# Patient Record
Sex: FEMALE | Race: WHITE | NOT HISPANIC OR LATINO | Employment: UNEMPLOYED | URBAN - METROPOLITAN AREA
[De-identification: names, ages, dates, MRNs, and addresses within clinical notes are randomized per-mention and may not be internally consistent; named-entity substitution may affect disease eponyms.]

---

## 2019-11-04 ENCOUNTER — OFFICE VISIT (OUTPATIENT)
Dept: URGENT CARE | Facility: CLINIC | Age: 59
End: 2019-11-04
Payer: MEDICARE

## 2019-11-04 VITALS
BODY MASS INDEX: 31.28 KG/M2 | HEIGHT: 62 IN | OXYGEN SATURATION: 96 % | SYSTOLIC BLOOD PRESSURE: 128 MMHG | WEIGHT: 170 LBS | TEMPERATURE: 97.6 F | HEART RATE: 88 BPM | RESPIRATION RATE: 16 BRPM | DIASTOLIC BLOOD PRESSURE: 84 MMHG

## 2019-11-04 DIAGNOSIS — L03.114 CELLULITIS OF LEFT HAND: ICD-10-CM

## 2019-11-04 PROCEDURE — 99204 OFFICE O/P NEW MOD 45 MIN: CPT | Performed by: NURSE PRACTITIONER

## 2019-11-04 RX ORDER — CLONAZEPAM 0.5 MG/1
TABLET ORAL
Refills: 1 | COMMUNITY
Start: 2019-10-09

## 2019-11-04 RX ORDER — CLONIDINE HYDROCHLORIDE 0.1 MG/1
TABLET ORAL
Refills: 1 | COMMUNITY
Start: 2019-10-23

## 2019-11-04 RX ORDER — ATORVASTATIN CALCIUM 20 MG/1
TABLET, FILM COATED ORAL
Refills: 3 | COMMUNITY
Start: 2019-09-24

## 2019-11-04 RX ORDER — DULOXETIN HYDROCHLORIDE 60 MG/1
60 CAPSULE, DELAYED RELEASE ORAL
Refills: 2 | COMMUNITY
Start: 2019-10-24

## 2019-11-04 RX ORDER — CEPHALEXIN 500 MG/1
500 CAPSULE ORAL 4 TIMES DAILY
Qty: 28 CAP | Refills: 0 | Status: SHIPPED | OUTPATIENT
Start: 2019-11-04 | End: 2019-11-11

## 2019-11-04 RX ORDER — AMITRIPTYLINE HYDROCHLORIDE 10 MG/1
TABLET, FILM COATED ORAL
Refills: 2 | COMMUNITY
Start: 2019-10-26

## 2019-11-04 RX ORDER — GABAPENTIN 300 MG/1
300 CAPSULE ORAL
Refills: 1 | COMMUNITY
Start: 2019-10-29

## 2019-11-04 RX ORDER — MELOXICAM 15 MG/1
TABLET ORAL
Refills: 0 | COMMUNITY
Start: 2019-09-17

## 2019-11-04 RX ORDER — ALBUTEROL SULFATE 90 UG/1
AEROSOL, METERED RESPIRATORY (INHALATION)
Refills: 1 | COMMUNITY
Start: 2019-10-29

## 2019-11-04 ASSESSMENT — ENCOUNTER SYMPTOMS
FEVER: 0
CONSTITUTIONAL NEGATIVE: 1
SHORTNESS OF BREATH: 0
CHILLS: 0
RESPIRATORY NEGATIVE: 1
MUSCULOSKELETAL NEGATIVE: 1
NEUROLOGICAL NEGATIVE: 1
CARDIOVASCULAR NEGATIVE: 1

## 2019-11-04 NOTE — PROGRESS NOTES
Subjective:     Madhuri Martinez is a 59 y.o. female who presents for Hand Pain (x3 days, (L) hand bruising and swelling, pain while performing ROMs)       Other   This is a new problem. The problem has been gradually worsening. Pertinent negatives include no chills or fever.     Patient reports that 2 days ago she started to experience redness of the top of her left hand.  She reports that it is starting to spread outward.  Denies acute injury.  Has full range of motion of her left hand.  She thinks she may have been bitten by an insect but is unsure.  Has not identified an insect.  Prior therapy: Icing and elevation.  Patient reports she has an ablation procedure tomorrow.    PMH:  has a past medical history of Anxiety, Chronic back pain, Depression, Irritable bowel syndrome, Mitral valve prolapse, and Tobacco abuse (3/5/2010).    MEDS:   Current Outpatient Medications:   •  VENTOLIN  (90 Base) MCG/ACT Aero Soln inhalation aerosol, INHALE 2 PUFFS ORALLY FOUR TIMES DAILY AS NEEDED FOR WHEEZING OR DIFFICULTY BREATHING, Disp: , Rfl: 1  •  amitriptyline (ELAVIL) 10 MG Tab, TAKE 1 TABLET BY MOUTH EVERYDAY AT BEDTIME, Disp: , Rfl: 2  •  atorvastatin (LIPITOR) 20 MG Tab, START 1/2 TABLET ORALLY EVERY EVENING FOR 1 WEEK INCREASE TO 1 TABLET ORALLY EVERY EVENING, Disp: , Rfl: 3  •  clonazePAM (KLONOPIN) 0.5 MG Tab, TAKE 1 TABLET BY MOUTH FOUR TIMES A DAY AS NEEDED 8/13 F41.9, Disp: , Rfl: 1  •  cloNIDine (CATAPRES) 0.1 MG Tab, TAKE 1 TABLET BY MOUTH UP TO TWICE DAILY FOR BLOOD PRESSURE, Disp: , Rfl: 1  •  DULoxetine (CYMBALTA) 60 MG Cap DR Particles delayed-release capsule, Take 60 mg by mouth., Disp: , Rfl: 2  •  gabapentin (NEURONTIN) 300 MG Cap, Take 300 mg by mouth., Disp: , Rfl: 1  •  cephALEXin (KEFLEX) 500 MG Cap, Take 1 Cap by mouth 4 times a day for 7 days., Disp: 28 Cap, Rfl: 0  •  alprazolam (XANAX) 1 MG Tab, Take 0.5 Tabs by mouth 3 times a day as needed for Sleep or Anxiety., Disp: 30 Tab, Rfl: 0  •   "Multiple Vitamins-Minerals (WOMENS MULTIVITAMIN PLUS) Tab, Take 1 Tab by mouth every day., Disp: , Rfl:   •  Calcium Carbonate (CALCIUM 600 PO), Take 1 Tab by mouth every day., Disp: , Rfl:   •  simvastatin (ZOCOR) 40 MG Tab, Take 40 mg by mouth every evening., Disp: , Rfl:   •  levothyroxine (SYNTHROID) 100 MCG Tab, Take 1 Tab by mouth every morning before breakfast., Disp: 30 Tab, Rfl: 0  •  meloxicam (MOBIC) 15 MG tablet, TAKE 1 TABLET BY MOUTH DAILY AS NEEDED TAKE WITH FOOD, Disp: , Rfl: 0  •  trazodone (DESYREL) 150 MG Tab, Take 0.5 Tabs by mouth every bedtime. (Patient not taking: Reported on 11/4/2019), Disp: 15 Tab, Rfl: 0  •  citalopram (CELEXA) 40 MG Tab, Take 40 mg by mouth every evening., Disp: , Rfl:   •  hydrocodone-acetaminophen (NORCO) 7.5-325 MG per tablet, Take 1-2 Tabs by mouth every 6 hours as needed., Disp: , Rfl:     ALLERGIES: No Known Allergies    SURGHX:   Past Surgical History:   Procedure Laterality Date   • CYST EXCISION       SOCHX:  reports that she quit smoking about 8 years ago. Her smoking use included cigarettes. She has a 15.00 pack-year smoking history. She has never used smokeless tobacco. She reports current alcohol use. She reports that she does not use drugs.     FH: Reviewed with patient, not pertinent to this visit.    Review of Systems   Constitutional: Negative.  Negative for chills, fever and malaise/fatigue.   Respiratory: Negative.  Negative for shortness of breath.    Cardiovascular: Negative.    Musculoskeletal: Negative.    Skin:        Redness, mild swelling a top of left hand   Neurological: Negative.    All other systems reviewed and are negative.    Objective:     /84   Pulse 88   Temp 36.4 °C (97.6 °F) (Temporal)   Resp 16   Ht 1.575 m (5' 2\")   Wt 77.1 kg (170 lb)   SpO2 96%   BMI 31.09 kg/m²     Physical Exam  Vitals signs reviewed.   Constitutional:       General: She is not in acute distress.     Appearance: She is well-developed. She is not " toxic-appearing or diaphoretic.   HENT:      Head: Normocephalic.      Right Ear: External ear normal.      Left Ear: External ear normal.      Nose: Nose normal.   Eyes:      Conjunctiva/sclera: Conjunctivae normal.   Neck:      Musculoskeletal: Normal range of motion.   Cardiovascular:      Rate and Rhythm: Normal rate.      Pulses:           Radial pulses are 2+ on the left side.   Pulmonary:      Effort: Pulmonary effort is normal. No respiratory distress.   Musculoskeletal: Normal range of motion.         General: No deformity.      Left hand: She exhibits tenderness and swelling (Mild at dorsal L hand). She exhibits normal range of motion, normal capillary refill, no deformity and no laceration. Normal sensation noted. Normal strength noted.        Hands:    Skin:     General: Skin is warm and dry.      Capillary Refill: Capillary refill takes less than 2 seconds.      Coloration: Skin is not pale.   Neurological:      Mental Status: She is alert and oriented to person, place, and time.      Sensory: No sensory deficit.      Coordination: Coordination normal.   Psychiatric:         Behavior: Behavior normal. Behavior is cooperative.          Assessment/Plan:     1. Cellulitis of left hand  - cephALEXin (KEFLEX) 500 MG Cap; Take 1 Cap by mouth 4 times a day for 7 days.  Dispense: 28 Cap; Refill: 0    Rx as above sent electronically.    Continue with RICE.    Discussed close monitoring and supportive measures.    Patient advised to: Return for 1) Symptoms don't improve or worsen, or go to ER, 2) Follow up with primary care in 7-10 days.    Differential diagnosis, natural history, supportive care, and indications for immediate follow-up discussed. All questions answered. Patient agrees with the plan of care.

## 2021-03-15 DIAGNOSIS — Z23 NEED FOR VACCINATION: ICD-10-CM
